# Patient Record
Sex: MALE | Race: WHITE | NOT HISPANIC OR LATINO | URBAN - METROPOLITAN AREA
[De-identification: names, ages, dates, MRNs, and addresses within clinical notes are randomized per-mention and may not be internally consistent; named-entity substitution may affect disease eponyms.]

---

## 2020-11-06 ENCOUNTER — INPATIENT (INPATIENT)
Facility: HOSPITAL | Age: 70
LOS: 0 days | Discharge: HOME | DRG: 247 | End: 2020-11-07
Attending: INTERNAL MEDICINE | Admitting: INTERNAL MEDICINE
Payer: COMMERCIAL

## 2020-11-06 VITALS — HEIGHT: 72 IN | WEIGHT: 214.95 LBS

## 2020-11-06 DIAGNOSIS — R07.9 CHEST PAIN, UNSPECIFIED: ICD-10-CM

## 2020-11-06 DIAGNOSIS — I20.0 UNSTABLE ANGINA: ICD-10-CM

## 2020-11-06 LAB
ALBUMIN SERPL ELPH-MCNC: 3.8 G/DL — SIGNIFICANT CHANGE UP (ref 3.3–5.2)
ALP SERPL-CCNC: 34 U/L — LOW (ref 40–120)
ALT FLD-CCNC: 15 U/L — SIGNIFICANT CHANGE UP
ANION GAP SERPL CALC-SCNC: 11 MMOL/L — SIGNIFICANT CHANGE UP (ref 5–17)
APTT BLD: 44.4 SEC — HIGH (ref 27.5–35.5)
AST SERPL-CCNC: 16 U/L — SIGNIFICANT CHANGE UP
BILIRUB SERPL-MCNC: 0.6 MG/DL — SIGNIFICANT CHANGE UP (ref 0.4–2)
BUN SERPL-MCNC: 17 MG/DL — SIGNIFICANT CHANGE UP (ref 8–20)
CALCIUM SERPL-MCNC: 9 MG/DL — SIGNIFICANT CHANGE UP (ref 8.6–10.2)
CHLORIDE SERPL-SCNC: 104 MMOL/L — SIGNIFICANT CHANGE UP (ref 98–107)
CO2 SERPL-SCNC: 26 MMOL/L — SIGNIFICANT CHANGE UP (ref 22–29)
CREAT SERPL-MCNC: 0.82 MG/DL — SIGNIFICANT CHANGE UP (ref 0.5–1.3)
GLUCOSE SERPL-MCNC: 101 MG/DL — HIGH (ref 70–99)
HCT VFR BLD CALC: 38.9 % — LOW (ref 39–50)
HGB BLD-MCNC: 13 G/DL — SIGNIFICANT CHANGE UP (ref 13–17)
INR BLD: 1.09 RATIO — SIGNIFICANT CHANGE UP (ref 0.88–1.16)
MAGNESIUM SERPL-MCNC: 1.9 MG/DL — SIGNIFICANT CHANGE UP (ref 1.6–2.6)
MCHC RBC-ENTMCNC: 31.6 PG — SIGNIFICANT CHANGE UP (ref 27–34)
MCHC RBC-ENTMCNC: 33.4 GM/DL — SIGNIFICANT CHANGE UP (ref 32–36)
MCV RBC AUTO: 94.6 FL — SIGNIFICANT CHANGE UP (ref 80–100)
PLATELET # BLD AUTO: 164 K/UL — SIGNIFICANT CHANGE UP (ref 150–400)
POTASSIUM SERPL-MCNC: 4.2 MMOL/L — SIGNIFICANT CHANGE UP (ref 3.5–5.3)
POTASSIUM SERPL-SCNC: 4.2 MMOL/L — SIGNIFICANT CHANGE UP (ref 3.5–5.3)
PROT SERPL-MCNC: 6.1 G/DL — LOW (ref 6.6–8.7)
PROTHROM AB SERPL-ACNC: 12.6 SEC — SIGNIFICANT CHANGE UP (ref 10.6–13.6)
RBC # BLD: 4.11 M/UL — LOW (ref 4.2–5.8)
RBC # FLD: 12.4 % — SIGNIFICANT CHANGE UP (ref 10.3–14.5)
SODIUM SERPL-SCNC: 141 MMOL/L — SIGNIFICANT CHANGE UP (ref 135–145)
WBC # BLD: 6.18 K/UL — SIGNIFICANT CHANGE UP (ref 3.8–10.5)
WBC # FLD AUTO: 6.18 K/UL — SIGNIFICANT CHANGE UP (ref 3.8–10.5)

## 2020-11-06 PROCEDURE — 93010 ELECTROCARDIOGRAM REPORT: CPT | Mod: 76

## 2020-11-06 RX ORDER — ATORVASTATIN CALCIUM 80 MG/1
80 TABLET, FILM COATED ORAL AT BEDTIME
Refills: 0 | Status: DISCONTINUED | OUTPATIENT
Start: 2020-11-06 | End: 2020-11-07

## 2020-11-06 RX ORDER — ONDANSETRON 8 MG/1
4 TABLET, FILM COATED ORAL EVERY 6 HOURS
Refills: 0 | Status: DISCONTINUED | OUTPATIENT
Start: 2020-11-06 | End: 2020-11-07

## 2020-11-06 RX ORDER — HYDROCHLOROTHIAZIDE 25 MG
25 TABLET ORAL DAILY
Refills: 0 | Status: DISCONTINUED | OUTPATIENT
Start: 2020-11-06 | End: 2020-11-07

## 2020-11-06 RX ORDER — CLOPIDOGREL BISULFATE 75 MG/1
75 TABLET, FILM COATED ORAL ONCE
Refills: 0 | Status: COMPLETED | OUTPATIENT
Start: 2020-11-06 | End: 2020-11-06

## 2020-11-06 RX ORDER — METOPROLOL TARTRATE 50 MG
25 TABLET ORAL DAILY
Refills: 0 | Status: DISCONTINUED | OUTPATIENT
Start: 2020-11-06 | End: 2020-11-06

## 2020-11-06 RX ORDER — SODIUM CHLORIDE 9 MG/ML
1000 INJECTION INTRAMUSCULAR; INTRAVENOUS; SUBCUTANEOUS
Refills: 0 | Status: DISCONTINUED | OUTPATIENT
Start: 2020-11-06 | End: 2020-11-07

## 2020-11-06 RX ORDER — ASPIRIN/CALCIUM CARB/MAGNESIUM 324 MG
1 TABLET ORAL
Qty: 0 | Refills: 0 | DISCHARGE

## 2020-11-06 RX ORDER — METOPROLOL TARTRATE 50 MG
25 TABLET ORAL DAILY
Refills: 0 | Status: DISCONTINUED | OUTPATIENT
Start: 2020-11-06 | End: 2020-11-07

## 2020-11-06 RX ORDER — ASPIRIN/CALCIUM CARB/MAGNESIUM 324 MG
81 TABLET ORAL DAILY
Refills: 0 | Status: DISCONTINUED | OUTPATIENT
Start: 2020-11-06 | End: 2020-11-07

## 2020-11-06 RX ORDER — AMLODIPINE BESYLATE 2.5 MG/1
1 TABLET ORAL
Qty: 0 | Refills: 0 | DISCHARGE

## 2020-11-06 RX ORDER — OXYCODONE HYDROCHLORIDE 5 MG/1
5 TABLET ORAL EVERY 6 HOURS
Refills: 0 | Status: DISCONTINUED | OUTPATIENT
Start: 2020-11-06 | End: 2020-11-07

## 2020-11-06 RX ORDER — AMLODIPINE BESYLATE 2.5 MG/1
5 TABLET ORAL DAILY
Refills: 0 | Status: DISCONTINUED | OUTPATIENT
Start: 2020-11-06 | End: 2020-11-07

## 2020-11-06 RX ORDER — ACETAMINOPHEN 500 MG
650 TABLET ORAL EVERY 6 HOURS
Refills: 0 | Status: DISCONTINUED | OUTPATIENT
Start: 2020-11-06 | End: 2020-11-07

## 2020-11-06 RX ORDER — CLOPIDOGREL BISULFATE 75 MG/1
75 TABLET, FILM COATED ORAL DAILY
Refills: 0 | Status: DISCONTINUED | OUTPATIENT
Start: 2020-11-07 | End: 2020-11-07

## 2020-11-06 RX ORDER — LOSARTAN POTASSIUM 100 MG/1
50 TABLET, FILM COATED ORAL DAILY
Refills: 0 | Status: DISCONTINUED | OUTPATIENT
Start: 2020-11-06 | End: 2020-11-07

## 2020-11-06 RX ORDER — METOPROLOL TARTRATE 50 MG
50 TABLET ORAL DAILY
Refills: 0 | Status: DISCONTINUED | OUTPATIENT
Start: 2020-11-06 | End: 2020-11-06

## 2020-11-06 RX ADMIN — Medication 25 MILLIGRAM(S): at 15:41

## 2020-11-06 RX ADMIN — CLOPIDOGREL BISULFATE 75 MILLIGRAM(S): 75 TABLET, FILM COATED ORAL at 16:58

## 2020-11-06 RX ADMIN — ATORVASTATIN CALCIUM 80 MILLIGRAM(S): 80 TABLET, FILM COATED ORAL at 21:52

## 2020-11-06 RX ADMIN — AMLODIPINE BESYLATE 5 MILLIGRAM(S): 2.5 TABLET ORAL at 15:40

## 2020-11-06 RX ADMIN — Medication 81 MILLIGRAM(S): at 11:25

## 2020-11-06 RX ADMIN — SODIUM CHLORIDE 100 MILLILITER(S): 9 INJECTION INTRAMUSCULAR; INTRAVENOUS; SUBCUTANEOUS at 13:53

## 2020-11-06 RX ADMIN — LOSARTAN POTASSIUM 50 MILLIGRAM(S): 100 TABLET, FILM COATED ORAL at 15:41

## 2020-11-06 NOTE — DISCHARGE NOTE PROVIDER - NSDCCPCAREPLAN_GEN_ALL_CORE_FT
PRINCIPAL DISCHARGE DIAGNOSIS  Diagnosis: S/P drug eluting coronary stent placement  Assessment and Plan of Treatment:

## 2020-11-06 NOTE — H&P PST ADULT - ASSESSMENT
71 y/o M with PMHx HTN now with CCSC III and abnormal CCTA revealing LAD calcification; pt here for Cardiac Cath with Dr. Emanuel.

## 2020-11-06 NOTE — H&P PST ADULT - NSICDXPROBLEM_GEN_ALL_CORE_FT
PROBLEM DIAGNOSES  Problem: Unstable angina  Assessment and Plan: -cardiac catheterization with Dr. Emanuel  -Procedure discussed with pt, risks and benefits explained, questions answered  -aspirin pre cath as rx  -heparin and nitro gtt off prior to procedure  -paperwork from [facility] reviewed  -discussed plan of care with pt and Dr. Emanuel

## 2020-11-06 NOTE — H&P PST ADULT - NEUROLOGICAL DETAILS
normal strength/alert and oriented x 3/responds to pain/responds to verbal commands/sensation intact/deep reflexes intact/cranial nerves intact/no spontaneous movement

## 2020-11-06 NOTE — H&P PST ADULT - NEGATIVE NEUROLOGICAL SYMPTOMS
no focal seizures/no difficulty walking/no headache/no transient paralysis/no weakness/no generalized seizures/no tremors/no syncope/no paresthesias/no vertigo/no loss of sensation/no loss of consciousness

## 2020-11-06 NOTE — DISCHARGE NOTE PROVIDER - NSDCMRMEDTOKEN_GEN_ALL_CORE_FT
amLODIPine 5 mg oral tablet: 1 tab(s) orally once a day  aspirin 81 mg oral tablet, chewable: 1 tab(s) orally once a day  valsartan:    amLODIPine 5 mg oral tablet: 1 tab(s) orally once a day  aspirin 81 mg oral tablet, chewable: 1 tab(s) orally once a day  atorvastatin 80 mg oral tablet: 1 tab(s) orally once a day (at bedtime)  clopidogrel 75 mg oral tablet: 1 tab(s) orally once a day  Diovan  mg-25 mg oral tablet: 1 tab(s) orally once a day  metoprolol succinate 25 mg oral tablet, extended release: 1 tab(s) orally once a day

## 2020-11-06 NOTE — H&P PST ADULT - HISTORY OF PRESENT ILLNESS
Narrative: This is a 69 y/o M, former smoker, with a PMHx of HTN, prostate cancer presented to the ED at [facility] with c/o continuous left sided chest pain radiating down his LUE, and jaw accompanied by lightheadedness  for the last 3 days. He reports this chest pain has come and gone before, however, this time the pain has increased in frequency and is much more persistent. He notes that it initially started with yard work and he attributed his pain to a "muscle strain". In the ED at [place], he endorsed chest pain at rest with diaphoresis and his EKG was notable for ST abnormalities which is a new finding as well as CCTA significant for a near complete occlusion of the LAD; he was started on a heparin and nitro gtt, asa and plavix loaded. He has been transferred from [place] to [facility] for planned cardiac catheterization with Dr. Emanuel.  He denies any family hx of CAD, MI, heart failure, nor has he ever had an ischemic evaluation prior to this hospitalization.    SAULO DAI Narrative: This is a 69 y/o M, former smoker (cigars 30 years ago; never used tobacco), with a PMHx of HTN, left inguinal hernia, prostate cancer (newly diagnosed; has not yet undergone tx but will be planning a prostatectomy) presented to the ED at [facility] with c/o continuous left sided chest pain radiating down his LUE, and jaw accompanied by lightheadedness  for the last 3 days. He reports this chest pain has come and gone before, however, this time the pain has increased in frequency and is much more persistent (First started Wednesday). He notes that it initially started with yard work and he attributed his pain to a "muscle strain". Yesterday morning (Thursday), he decided to have his wife drive him to the ED since his chest pain woke him out of his sleep and he woke up overnight with c/o "sweating so bad he had to change his shirt".In the ED at [place], he endorsed chest pain at rest with diaphoresis and his EKG was notable for ST abnormalities (which is a new finding), otherwise NSR as well as CCTA significant for a near complete occlusion of the LAD; he was started on a heparin and nitro gtt, asa and plavix loaded. He has been transferred from [place] to [facility] for planned cardiac catheterization with Dr. Emanuel.  He denies any family hx of CAD, MI, heart failure, nor has he ever had an ischemic evaluation prior to this hospitalization. ("I had a stress test I think maybe 12 years ago).  He currently denies chest pain, SOB, palps, lower extremity edema.    ASA 3   MALL 2  BRA Narrative: This is a 71 y/o M, former smoker (cigars 30 years ago; never used tobacco), with a PMHx of HTN, left inguinal hernia, prostate cancer (newly diagnosed; has not yet undergone tx but will be planning a prostatectomy) presented to the ED at [facility] with c/o continuous left sided chest pain radiating down his LUE, and jaw accompanied by lightheadedness  for the last 3 days. He reports this chest pain has come and gone before, however, this time the pain has increased in frequency and is much more persistent (First started Wednesday). He notes that it initially started with yard work and he attributed his pain to a "muscle strain". Yesterday morning (Thursday), he decided to have his wife drive him to the ED since his chest pain woke him out of his sleep and he woke up overnight with c/o "sweating so bad he had to change his shirt".In the ED at [place], he endorsed chest pain at rest with diaphoresis and his EKG was notable for ST abnormalities (which is a new finding), otherwise NSR as well as CCTA significant for a near complete occlusion of the LAD; he was started on a heparin and nitro gtt, asa and plavix loaded. He has been transferred from [place] to [facility] for planned cardiac catheterization with Dr. Emanuel.    He denies any family hx of CAD, MI, heart failure, nor has he ever had an ischemic evaluation prior to this hospitalization. ("I had a stress test I think maybe 12 years ago).  He currently denies chest pain, SOB, palps, lower extremity edema.    ASA 3   MALL 2  BRA 0.8%  GFR 91

## 2020-11-06 NOTE — H&P PST ADULT - REASON FOR ADMISSION
Left heart cardiac catheterization due to CCSC III, abnormal CCTA with LAD calcification, abnormal EKG

## 2020-11-06 NOTE — DISCHARGE NOTE PROVIDER - NSDCCPTREATMENT_GEN_ALL_CORE_FT
PRINCIPAL PROCEDURE  Procedure: Left heart cardiac catheterization  Findings and Treatment: Restricted use with no heavy lifting of affected arm for 48 hours.  No submerging the arm in water for 48 hours.  You may start showering today.  Call your doctor for any bleeding, swelling, loss of sensation in the hand or fingers, or fingers turning blue.  If heavy bleeding or large lumps form, hold pressure at the spot and come to the Emergency Room.

## 2020-11-06 NOTE — DISCHARGE NOTE PROVIDER - HOSPITAL COURSE
71 y/o M, former smoker (cigars 30 years ago; never used tobacco), with a PMHx of HTN, left inguinal hernia, prostate cancer (newly diagnosed; has not yet undergone tx but will be planning a prostatectomy) presented to the ED at [facility] with c/o continuous left sided chest pain radiating down his LUE, and jaw accompanied by lightheadedness  for the last 3 days. He reports this chest pain has come and gone before, however, this time the pain has increased in frequency and is much more persistent (First started Wednesday). He notes that it initially started with yard work and he attributed his pain to a "muscle strain". Yesterday morning (Thursday), he decided to have his wife drive him to the ED since his chest pain woke him out of his sleep and he woke up overnight with c/o "sweating so bad he had to change his shirt".In the ED at [place], he endorsed chest pain at rest with diaphoresis and his EKG was notable for ST abnormalities (which is a new finding), otherwise NSR as well as CCTA significant for a near complete occlusion of the LAD; he was started on a heparin and nitro gtt, asa and plavix loaded. He has been transferred from [place] to [facility] for planned cardiac catheterization with Dr. Emanuel.    He denies any family hx of CAD, MI, heart failure, nor has he ever had an ischemic evaluation prior to this hospitalization. ("I had a stress test I think maybe 12 years ago).    now s/p left heart catheterization via right radial approach (+band in place, no site complications) with Dr. Emanuel:  Right radial precautions  Ostial LAD resolute patrice VINCE placed 3.50 x18MM 71 y/o M, former smoker (cigars 30 years ago; never used tobacco), with a PMHx of HTN, left inguinal hernia, prostate cancer (newly diagnosed; has not yet undergone tx but will be planning a prostatectomy) presented to the ED at [facility] with c/o continuous left sided chest pain radiating down his LUE, and jaw accompanied by lightheadedness  for the last 3 days. He reports this chest pain has come and gone before, however, this time the pain has increased in frequency and is much more persistent (First started Wednesday). He notes that it initially started with yard work and he attributed his pain to a "muscle strain". Yesterday morning (Thursday), he decided to have his wife drive him to the ED since his chest pain woke him out of his sleep and he woke up overnight with c/o "sweating so bad he had to change his shirt".In the ED at [place], he endorsed chest pain at rest with diaphoresis and his EKG was notable for ST abnormalities (which is a new finding), otherwise NSR as well as CCTA significant for a near complete occlusion of the LAD; he was started on a heparin and nitro gtt, asa and plavix loaded. He has been transferred from [place] to [facility] for planned cardiac catheterization with Dr. mEanuel.    He denies any family hx of CAD, MI, heart failure, nor has he ever had an ischemic evaluation prior to this hospitalization. ("I had a stress test I think maybe 12 years ago).    now s/p left heart catheterization via right radial approach (+band in place, no site complications) with Dr. Emanuel:  Right radial precautions  Ostial LAD resolute patrice VINCE placed 3.50 x18MM

## 2020-11-06 NOTE — DISCHARGE NOTE PROVIDER - NSDCACTIVITY_GEN_ALL_CORE
Choose lean meats and poultry without skin and prepare them without added saturated and trans fat.  Eat fish at least twice a week. Recent research shows that eating oily fish containing omega-3 fatty acids (for example, salmon, trout and herring) may help lower your risk of death from coronary artery disease.  Select fat-free, 1 percent fat and low-fat dairy products.  Cut back on foods containing partially hydrogenated vegetable oils to reduce trans fat in your diet.   To lower cholesterol, reduce saturated fat to no more than 5 to 6 percent of total calories. For someone eating 2,000 calories a day, that’s about 13 grams of saturated fat.  Cut back on beverages and foods with added sugars.  Choose and prepare foods with little or no salt. To lower blood pressure, aim to eat no more than 2,400 milligrams of sodium per day. Reducing daily intake to 1,500 mg is desirable because it can lower blood pressure even further.  If you drink alcohol, drink in moderation. That means one drink per day if you’re a woman and two drinks  per day if you’re a man.  Follow the American Heart Association recommendations when you eat out, and keep an eye on your portion sizes. Walking - Indoors allowed/Choose lean meats and poultry without skin and prepare them without added saturated and trans fat.  Eat fish at least twice a week. Recent research shows that eating oily fish containing omega-3 fatty acids (for example, salmon, trout and herring) may help lower your risk of death from coronary artery disease.  Select fat-free, 1 percent fat and low-fat dairy products.  Cut back on foods containing partially hydrogenated vegetable oils to reduce trans fat in your diet.   To lower cholesterol, reduce saturated fat to no more than 5 to 6 percent of total calories. For someone eating 2,000 calories a day, that’s about 13 grams of saturated fat.  Cut back on beverages and foods with added sugars.  Choose and prepare foods with little or no salt. To lower blood pressure, aim to eat no more than 2,400 milligrams of sodium per day. Reducing daily intake to 1,500 mg is desirable because it can lower blood pressure even further.  If you drink alcohol, drink in moderation. That means one drink per day if you’re a woman and two drinks  per day if you’re a man.  Follow the American Heart Association recommendations when you eat out, and keep an eye on your portion sizes./Stairs allowed/No heavy lifting/straining/Showering allowed/Walking - Outdoors allowed

## 2020-11-06 NOTE — PROGRESS NOTE ADULT - SUBJECTIVE AND OBJECTIVE BOX
Department of Cardiology                                                                  [facility]/[place] [address] [address] [facility]                                                            Telephone: [phone]. Fax:[phone]                                                    Post- Procedure Note: Left Heart Cardiac Catheterization       Narrative:  70y  Male is now s/p left heart catheterization via right radial approach (+band in place, no site complications) with Dr. Emanuel:  Right radial precautions  Ostial LAD resolute patrice VINCE placed 3.50 x18MM  Heparin used: 11,500 units  Contrast used: 136ml  IVF used: NS 100ml           PAST MEDICAL & SURGICAL HISTORY:  Prostate cancer    New-onset angina  CCSC III    HTN (hypertension)    CAD (coronary artery disease)    Home Medications:  amLODIPine 5 mg oral tablet: 1 tab(s) orally once a day (06 Nov 2020 10:45)  aspirin 81 mg oral tablet, chewable: 1 tab(s) orally once a day (06 Nov 2020 10:45)  valsartan:  (06 Nov 2020 10:45)    Patient is a 70y old  Male who presents with a chief complaint of Left heart cardiac catheterization due to CCSC III, abnormal CCTA with LAD calcification, abnormal EKG (06 Nov 2020 09:56)    HEALTH ISSUES - PROBLEM Dx:  Unstable angina      HPI:  Narrative: This is a 71 y/o M, former smoker (cigars 30 years ago; never used tobacco), with a PMHx of HTN, left inguinal hernia, prostate cancer (newly diagnosed; has not yet undergone tx but will be planning a prostatectomy) presented to the ED at [facility] with c/o continuous left sided chest pain radiating down his LUE, and jaw accompanied by lightheadedness  for the last 3 days. He reports this chest pain has come and gone before, however, this time the pain has increased in frequency and is much more persistent (First started Wednesday). He notes that it initially started with yard work and he attributed his pain to a "muscle strain". Yesterday morning (Thursday), he decided to have his wife drive him to the ED since his chest pain woke him out of his sleep and he woke up overnight with c/o "sweating so bad he had to change his shirt".In the ED at [place], he endorsed chest pain at rest with diaphoresis and his EKG was notable for ST abnormalities (which is a new finding), otherwise NSR as well as CCTA significant for a near complete occlusion of the LAD; he was started on a heparin and nitro gtt, asa and plavix loaded. He has been transferred from [place] to [facility] for planned cardiac catheterization with Dr. Emanuel.    He denies any family hx of CAD, MI, heart failure, nor has he ever had an ischemic evaluation prior to this hospitalization. ("I had a stress test I think maybe 12 years ago).  He currently denies chest pain, SOB, palps, lower extremity edema.    ASA 3   MALL 2  BRA 0.8%  GFR 91 (06 Nov 2020 09:56)    General: No fatigue, no fevers/chills  Respiratory: No dyspnea, no cough, no wheeze  CV: No chest pain, no palpitations  Abd: No nausea  Neuro: No headache, no dizziness    Objective:  Vital Signs Last 24 Hrs  T(C): --  T(F): --  HR: 52 (06 Nov 2020 13:29) (52 - 63)  BP: 142/80 (06 Nov 2020 13:29) (142/80 - 161/77)  BP(mean): --  RR: 16 (06 Nov 2020 13:29) (16 - 16)  SpO2: 97% (06 Nov 2020 13:29) (97% - 100%)    Neuro: A&OX3, CN 2-12 intact  HEENT: NC, AT  Lungs: CTA B/L  CV: S1, S2, no murmur, NSR  Abd: Soft  Right Radial Cath Site: +band in place, +pulse,  no bleeding, no hematoma  Extremity: + distal pulses  EKG: NSR 92bpm                        13.0   6.18  )-----------( 164      ( 06 Nov 2020 10:29 )             38.9     11-06    141  |  104  |  17.0  ----------------------------<  101<H>  4.2   |  26.0  |  0.82    Ca    9.0      06 Nov 2020 10:29  Mg     1.9     11-06    TPro  6.1<L>  /  Alb  3.8  /  TBili  0.6  /  DBili  x   /  AST  16  /  ALT  15  /  AlkPhos  34<L>  11-06  PT/INR - ( 06 Nov 2020 10:29 )   PT: 12.6 sec;   INR: 1.09 ratio    PTT - ( 06 Nov 2020 10:29 )  PTT:44.4 sec      70y  Male is now s/p left heart catheterization via right radial approach with Dr. Emanuel    -ADMIT due to: ostial LAD LESION  -post cardiac cath orders  -right radial precautions  -bedrest x 2 hours post procedure  -EKG post cath  -labs and EKG in am  -IVF NS 100ml/hr x 5 hours  -Dual anti platelet therapy with aspirin/ plavix   -statin therapy; atorvastatin 80mg  -losartan 50mg po daily with parameters  -hydrochlorothiazide 25mg daily  -toprol 50mg po daily with parameters  -follow up outpt in 2 weeks with Cardiologist. Dr. Emanuel  -[facility] following during hospitalization  -cardiac rehab info provided/referral and communication to cardiac rehab completed  -Discharge in am if overnight tele, EKG, labs all remain WNL  -Discussed plan of care with patient and Dr. Emanuel   Department of Cardiology                                                                  [facility]/[place] [address] [address] [facility]                                                            Telephone: [phone]. Fax:[phone]                                                    Post- Procedure Note: Left Heart Cardiac Catheterization       Narrative:  70y  Male is now s/p left heart catheterization via right radial approach (+band in place, no site complications) with Dr. Emanuel:  Right radial precautions  Ostial LAD resolute patrice VINCE placed 3.50 x18MM  Heparin used: 11,500 units  Contrast used: 136ml  IVF used: NS 100ml           PAST MEDICAL & SURGICAL HISTORY:  Prostate cancer    New-onset angina  CCSC III    HTN (hypertension)    CAD (coronary artery disease)    Home Medications:  amLODIPine 5 mg oral tablet: 1 tab(s) orally once a day (06 Nov 2020 10:45)  aspirin 81 mg oral tablet, chewable: 1 tab(s) orally once a day (06 Nov 2020 10:45)  valsartan:  (06 Nov 2020 10:45)    Patient is a 70y old  Male who presents with a chief complaint of Left heart cardiac catheterization due to CCSC III, abnormal CCTA with LAD calcification, abnormal EKG (06 Nov 2020 09:56)    HEALTH ISSUES - PROBLEM Dx:  Unstable angina      HPI:  Narrative: This is a 71 y/o M, former smoker (cigars 30 years ago; never used tobacco), with a PMHx of HTN, left inguinal hernia, prostate cancer (newly diagnosed; has not yet undergone tx but will be planning a prostatectomy) presented to the ED at [facility] with c/o continuous left sided chest pain radiating down his LUE, and jaw accompanied by lightheadedness  for the last 3 days. He reports this chest pain has come and gone before, however, this time the pain has increased in frequency and is much more persistent (First started Wednesday). He notes that it initially started with yard work and he attributed his pain to a "muscle strain". Yesterday morning (Thursday), he decided to have his wife drive him to the ED since his chest pain woke him out of his sleep and he woke up overnight with c/o "sweating so bad he had to change his shirt".In the ED at [place], he endorsed chest pain at rest with diaphoresis and his EKG was notable for ST abnormalities (which is a new finding), otherwise NSR as well as CCTA significant for a near complete occlusion of the LAD; he was started on a heparin and nitro gtt, asa and plavix loaded. He has been transferred from [place] to [facility] for planned cardiac catheterization with Dr. Emanuel.    He denies any family hx of CAD, MI, heart failure, nor has he ever had an ischemic evaluation prior to this hospitalization. ("I had a stress test I think maybe 12 years ago).  He currently denies chest pain, SOB, palps, lower extremity edema.    ASA 3   MALL 2  BRA 0.8%  GFR 91 (06 Nov 2020 09:56)    General: No fatigue, no fevers/chills  Respiratory: No dyspnea, no cough, no wheeze  CV: No chest pain, no palpitations  Abd: No nausea  Neuro: No headache, no dizziness    Objective:  Vital Signs Last 24 Hrs  T(C): --  T(F): --  HR: 52 (06 Nov 2020 13:29) (52 - 63)  BP: 142/80 (06 Nov 2020 13:29) (142/80 - 161/77)  BP(mean): --  RR: 16 (06 Nov 2020 13:29) (16 - 16)  SpO2: 97% (06 Nov 2020 13:29) (97% - 100%)    Neuro: A&OX3, CN 2-12 intact  HEENT: NC, AT  Lungs: CTA B/L  CV: S1, S2, no murmur, NSR  Abd: Soft  Right Radial Cath Site: +band in place, +pulse,  no bleeding, no hematoma  Extremity: + distal pulses  EKG: NSR 92bpm                        13.0   6.18  )-----------( 164      ( 06 Nov 2020 10:29 )             38.9     11-06    141  |  104  |  17.0  ----------------------------<  101<H>  4.2   |  26.0  |  0.82    Ca    9.0      06 Nov 2020 10:29  Mg     1.9     11-06    TPro  6.1<L>  /  Alb  3.8  /  TBili  0.6  /  DBili  x   /  AST  16  /  ALT  15  /  AlkPhos  34<L>  11-06  PT/INR - ( 06 Nov 2020 10:29 )   PT: 12.6 sec;   INR: 1.09 ratio    PTT - ( 06 Nov 2020 10:29 )  PTT:44.4 sec      70y  Male is now s/p left heart catheterization via right radial approach with Dr. Emanuel    -ADMIT due to: ostial LAD LESION  -post cardiac cath orders  -right radial precautions  -bedrest x 2 hours post procedure  -EKG post cath  -labs and EKG in am  -IVF NS 100ml/hr x 5 hours  -Dual anti platelet therapy with aspirin/ plavix   -statin therapy; atorvastatin 80mg  -losartan 50mg po daily with parameters  -hydrochlorothiazide 25mg daily  -amlodipine 5mg daily  -toprol 25mg po daily with parameters; advance as tolerated and please make cardiology NP aware if you hold additional dose  -follow up outpt in 2 weeks with Cardiologist. Dr. Emanuel  -[facility] following during hospitalization  -cardiac rehab info provided/referral and communication to cardiac rehab completed  -Discharge in am if overnight tele, EKG, labs all remain WNL  -Discussed plan of care with patient and Dr. Emanuel

## 2020-11-06 NOTE — H&P PST ADULT - OTHER CARE PROVIDERS
Dr. Emanuel (Interventional Cardiologist) Dr. Emanuel (Interventional Cardiologist), Dr. Tellez (Urologist)

## 2020-11-06 NOTE — H&P PST ADULT - RS GEN PE MLT RESP DETAILS PC
clear to auscultation bilaterally/no rales/breath sounds equal/good air movement/respirations non-labored/no chest wall tenderness/airway patent/normal

## 2020-11-06 NOTE — PRE PROCEDURE NOTE - PRE PROCEDURE EVALUATION
I have examined the patient. I have explained risk and benefits. I have attained consent. I agree with a cardiac catheterization/PCI.

## 2020-11-06 NOTE — DISCHARGE NOTE PROVIDER - CARE PROVIDER_API CALL
Enrrique Emanuel  Emergency Medicine  [address] [place] [address]  Phone: [phone]  Fax: [phone]  Follow Up Time:

## 2020-11-06 NOTE — H&P PST ADULT - NSICDXPASTMEDICALHX_GEN_ALL_CORE_FT
PAST MEDICAL HISTORY:  CAD (coronary artery disease)     HTN (hypertension)     New-onset angina CCSC III    Prostate cancer

## 2020-11-07 VITALS — SYSTOLIC BLOOD PRESSURE: 143 MMHG | DIASTOLIC BLOOD PRESSURE: 83 MMHG | RESPIRATION RATE: 16 BRPM | HEART RATE: 66 BPM

## 2020-11-07 LAB
A1C WITH ESTIMATED AVERAGE GLUCOSE RESULT: 5.1 % — SIGNIFICANT CHANGE UP (ref 4–5.6)
ALBUMIN SERPL ELPH-MCNC: 3.7 G/DL — SIGNIFICANT CHANGE UP (ref 3.3–5.2)
ALP SERPL-CCNC: 34 U/L — LOW (ref 40–120)
ALT FLD-CCNC: 15 U/L — SIGNIFICANT CHANGE UP
ANION GAP SERPL CALC-SCNC: 11 MMOL/L — SIGNIFICANT CHANGE UP (ref 5–17)
AST SERPL-CCNC: 16 U/L — SIGNIFICANT CHANGE UP
BILIRUB SERPL-MCNC: 0.8 MG/DL — SIGNIFICANT CHANGE UP (ref 0.4–2)
BUN SERPL-MCNC: 16 MG/DL — SIGNIFICANT CHANGE UP (ref 8–20)
CALCIUM SERPL-MCNC: 9 MG/DL — SIGNIFICANT CHANGE UP (ref 8.6–10.2)
CHLORIDE SERPL-SCNC: 104 MMOL/L — SIGNIFICANT CHANGE UP (ref 98–107)
CHOLEST SERPL-MCNC: 141 MG/DL — SIGNIFICANT CHANGE UP
CO2 SERPL-SCNC: 25 MMOL/L — SIGNIFICANT CHANGE UP (ref 22–29)
CREAT SERPL-MCNC: 0.84 MG/DL — SIGNIFICANT CHANGE UP (ref 0.5–1.3)
ESTIMATED AVERAGE GLUCOSE: 100 MG/DL — SIGNIFICANT CHANGE UP (ref 68–114)
GLUCOSE SERPL-MCNC: 103 MG/DL — HIGH (ref 70–99)
HCT VFR BLD CALC: 39.9 % — SIGNIFICANT CHANGE UP (ref 39–50)
HDLC SERPL-MCNC: 73 MG/DL — SIGNIFICANT CHANGE UP
HGB BLD-MCNC: 13.7 G/DL — SIGNIFICANT CHANGE UP (ref 13–17)
LIPID PNL WITH DIRECT LDL SERPL: 56 MG/DL — SIGNIFICANT CHANGE UP
MAGNESIUM SERPL-MCNC: 1.8 MG/DL — SIGNIFICANT CHANGE UP (ref 1.8–2.6)
MCHC RBC-ENTMCNC: 31.9 PG — SIGNIFICANT CHANGE UP (ref 27–34)
MCHC RBC-ENTMCNC: 34.3 GM/DL — SIGNIFICANT CHANGE UP (ref 32–36)
MCV RBC AUTO: 93 FL — SIGNIFICANT CHANGE UP (ref 80–100)
NON HDL CHOLESTEROL: 68 MG/DL — SIGNIFICANT CHANGE UP
PLATELET # BLD AUTO: 163 K/UL — SIGNIFICANT CHANGE UP (ref 150–400)
POTASSIUM SERPL-MCNC: 3.7 MMOL/L — SIGNIFICANT CHANGE UP (ref 3.5–5.3)
POTASSIUM SERPL-SCNC: 3.7 MMOL/L — SIGNIFICANT CHANGE UP (ref 3.5–5.3)
PROT SERPL-MCNC: 6 G/DL — LOW (ref 6.6–8.7)
RBC # BLD: 4.29 M/UL — SIGNIFICANT CHANGE UP (ref 4.2–5.8)
RBC # FLD: 12.3 % — SIGNIFICANT CHANGE UP (ref 10.3–14.5)
SODIUM SERPL-SCNC: 140 MMOL/L — SIGNIFICANT CHANGE UP (ref 135–145)
TRIGL SERPL-MCNC: 61 MG/DL — SIGNIFICANT CHANGE UP
WBC # BLD: 6.96 K/UL — SIGNIFICANT CHANGE UP (ref 3.8–10.5)
WBC # FLD AUTO: 6.96 K/UL — SIGNIFICANT CHANGE UP (ref 3.8–10.5)

## 2020-11-07 PROCEDURE — 80061 LIPID PANEL: CPT

## 2020-11-07 PROCEDURE — 99152 MOD SED SAME PHYS/QHP 5/>YRS: CPT

## 2020-11-07 PROCEDURE — 85610 PROTHROMBIN TIME: CPT

## 2020-11-07 PROCEDURE — C1894: CPT

## 2020-11-07 PROCEDURE — 36415 COLL VENOUS BLD VENIPUNCTURE: CPT

## 2020-11-07 PROCEDURE — 99153 MOD SED SAME PHYS/QHP EA: CPT

## 2020-11-07 PROCEDURE — C1769: CPT

## 2020-11-07 PROCEDURE — 85027 COMPLETE CBC AUTOMATED: CPT

## 2020-11-07 PROCEDURE — C9600: CPT | Mod: LD

## 2020-11-07 PROCEDURE — 85730 THROMBOPLASTIN TIME PARTIAL: CPT

## 2020-11-07 PROCEDURE — 93458 L HRT ARTERY/VENTRICLE ANGIO: CPT | Mod: XU

## 2020-11-07 PROCEDURE — C1887: CPT

## 2020-11-07 PROCEDURE — 93010 ELECTROCARDIOGRAM REPORT: CPT

## 2020-11-07 PROCEDURE — 83036 HEMOGLOBIN GLYCOSYLATED A1C: CPT

## 2020-11-07 PROCEDURE — C1725: CPT

## 2020-11-07 PROCEDURE — C1874: CPT

## 2020-11-07 PROCEDURE — 93005 ELECTROCARDIOGRAM TRACING: CPT

## 2020-11-07 PROCEDURE — 80053 COMPREHEN METABOLIC PANEL: CPT

## 2020-11-07 PROCEDURE — 83735 ASSAY OF MAGNESIUM: CPT

## 2020-11-07 RX ORDER — VALSARTAN 80 MG/1
0 TABLET ORAL
Qty: 0 | Refills: 0 | DISCHARGE

## 2020-11-07 RX ORDER — METOPROLOL TARTRATE 50 MG
1 TABLET ORAL
Qty: 90 | Refills: 3
Start: 2020-11-07 | End: 2021-11-01

## 2020-11-07 RX ORDER — CLOPIDOGREL BISULFATE 75 MG/1
1 TABLET, FILM COATED ORAL
Qty: 90 | Refills: 3
Start: 2020-11-07 | End: 2021-11-01

## 2020-11-07 RX ORDER — POTASSIUM CHLORIDE 20 MEQ
40 PACKET (EA) ORAL ONCE
Refills: 0 | Status: COMPLETED | OUTPATIENT
Start: 2020-11-07 | End: 2020-11-07

## 2020-11-07 RX ORDER — ATORVASTATIN CALCIUM 80 MG/1
1 TABLET, FILM COATED ORAL
Qty: 90 | Refills: 3
Start: 2020-11-07 | End: 2021-11-01

## 2020-11-07 RX ADMIN — Medication 40 MILLIEQUIVALENT(S): at 08:20

## 2020-11-07 RX ADMIN — Medication 25 MILLIGRAM(S): at 04:43

## 2020-11-07 RX ADMIN — CLOPIDOGREL BISULFATE 75 MILLIGRAM(S): 75 TABLET, FILM COATED ORAL at 11:08

## 2020-11-07 RX ADMIN — Medication 81 MILLIGRAM(S): at 11:07

## 2020-11-07 NOTE — DISCHARGE NOTE NURSING/CASE MANAGEMENT/SOCIAL WORK - PATIENT PORTAL LINK FT
You can access the FollowMyHealth Patient Portal offered by [facility] by registering at the following website: [email or web address]. By joining [employer]’s FollowMyHealth portal, you will also be able to view your health information using other applications (apps) compatible with our system.

## 2022-04-26 PROBLEM — Z00.00 ENCOUNTER FOR PREVENTIVE HEALTH EXAMINATION: Status: ACTIVE | Noted: 2022-04-26

## 2022-11-20 ENCOUNTER — FORM ENCOUNTER (OUTPATIENT)
Age: 72
End: 2022-11-20

## 2022-12-27 NOTE — PROGRESS NOTE ADULT - SUBJECTIVE AND OBJECTIVE BOX
Would you be okay with putting this order in for him?   Nurse Practitioner Progress note:     INTERVAL HISTORY: 70 year old male with c/o chest pain    MEDICATIONS:  amLODIPine   Tablet 5 milliGRAM(s) Oral daily  hydrochlorothiazide 25 milliGRAM(s) Oral daily  losartan 50 milliGRAM(s) Oral daily  metoprolol succinate ER 25 milliGRAM(s) Oral daily  acetaminophen   Tablet .. 650 milliGRAM(s) Oral every 6 hours PRN  ondansetron Injectable 4 milliGRAM(s) IV Push every 6 hours PRN  oxyCODONE    IR 5 milliGRAM(s) Oral every 6 hours PRN  aluminum hydroxide/magnesium hydroxide/simethicone Suspension 30 milliLiter(s) Oral every 4 hours PRN  atorvastatin 80 milliGRAM(s) Oral at bedtime  aspirin  chewable 81 milliGRAM(s) Oral daily  clopidogrel Tablet 75 milliGRAM(s) Oral daily  potassium chloride    Tablet ER 40 milliEquivalent(s) Oral once  sodium chloride 0.9%. 1000 milliLiter(s) IV Continuous <Continuous>      TELEMETRY: NSR    T(C): --  HR: 70 (11-07-20 @ 04:30) (52 - 70)  BP: 162/83 (11-07-20 @ 04:30) (132/75 - 173/84)  RR: 16 (11-07-20 @ 04:30) (14 - 18)  SpO2: 98% (11-07-20 @ 04:30) (96% - 100%)  Wt(kg): --    PHYSICAL EXAM:  Appearance: Normal	  HEENT:   Normal oral mucosa, PERRL  Cardiovascular: Normal S1 S2, No JVD, No murmurs, No edema  Respiratory: Lungs clear to auscultation	  Psychiatry: A & O x 3, Mood & affect appropriate  Gastrointestinal:  Soft, Non-tender, + BS	  Neurologic: Non-focal, A&O X3.  No neuro deficits  Extremities: Normal range of motion, No clubbing, cyanosis or edema  Vascular: Peripheral pulses palpable 2+ bilaterally  Procedure Site: Right radial dressing removed.  Site benign.  No bleeding/hematoma/ecchymosis.  + palp radial pulse    12 lead EKG:  	SB 55 bpm.  No acute changes    LABS:	 	                        13.7   6.96  )-----------( 163      ( 07 Nov 2020 04:47 )             39.9     11-07    140  |  104  |  16.0  ----------------------------<  103<H>  3.7   |  25.0  |  0.84    Ca    9.0      07 Nov 2020 04:47  Mg     1.8     11-07    TPro  6.0<L>  /  Alb  3.7  /  TBili  0.8  /  DBili  x   /  AST  16  /  ALT  15  /  AlkPhos  34<L>  11-07      PROCEDURE RESULTS: POD #1 VINCE X1 LAD    ASSESSMENT/PLAN: 	  -Radial precautions reviewed  -Resume home meds  -DAPT  -Follow up with Dr. Emanuel  -Discharge to home

## 2023-04-04 NOTE — DISCHARGE NOTE NURSING/CASE MANAGEMENT/SOCIAL WORK - INFLUENZA IMMUNIZATION DATE (APPROXIMATE):
01-Oct-2020
.
Tremfya Counseling: I discussed with the patient the risks of guselkumab including but not limited to immunosuppression, serious infections, and drug reactions.  The patient understands that monitoring is required including a PPD at baseline and must alert us or the primary physician if symptoms of infection or other concerning signs are noted.